# Patient Record
Sex: MALE | Race: WHITE
[De-identification: names, ages, dates, MRNs, and addresses within clinical notes are randomized per-mention and may not be internally consistent; named-entity substitution may affect disease eponyms.]

---

## 2020-01-04 ENCOUNTER — HOSPITAL ENCOUNTER (EMERGENCY)
Dept: HOSPITAL 33 - ED | Age: 25
Discharge: HOME | End: 2020-01-04
Payer: COMMERCIAL

## 2020-01-04 VITALS — OXYGEN SATURATION: 99 % | SYSTOLIC BLOOD PRESSURE: 133 MMHG | DIASTOLIC BLOOD PRESSURE: 98 MMHG | HEART RATE: 88 BPM

## 2020-01-04 DIAGNOSIS — H66.92: Primary | ICD-10-CM

## 2020-01-04 PROCEDURE — 99283 EMERGENCY DEPT VISIT LOW MDM: CPT

## 2020-01-04 NOTE — ERPHSYRPT
- History of Present Illness


Time Seen by Provider: 01/04/20 01:45


Source: patient


Exam Limitations: no limitations


Patient Subjective Stated Complaint: pt states he has an earache that started 

yesterday at home. describes pain as throbbing and pressure


Triage Nursing Assessment: pt alert and oriented, answers questions approp. pt 

ambualtory with steady gait noted. respirations nonlabored with lungs cta. skin 

pink warm and dry. redness noted to lt ear. no drainage noted.


Physician History: 





ppatient is a 24-year-old white male who has had recent upper respiratory 

infection symptoms.  Who awoke with the increasing left ear pain which started 

last evening.


Timing/Duration: gradual onset


Severity: moderate


ENT Location: ear (L)


Prearrival Treatment: no prearrival treatment


Modifying Factors: Improves With: activity


Associated Symptoms: ear pain (L), cough, change in hearing, nasal congestion/

drainage


Hx Tetanus, Diphtheria Vaccination/Date Given: Yes (2019)


Hx Influenza Vaccination/Date Given: No


Hx Pneumococcal Vaccination/Date Given: No





- Review of Systems


Constitutional: No Fever, No Chills


Eyes: No Symptoms


Ears, Nose, & Throat: Ear Pain, Nose Discharge


Respiratory: No Cough, No Dyspnea


Cardiac: No Chest Pain, No Edema, No Syncope


Abdominal/Gastrointestinal: No Abdominal Pain, No Nausea, No Vomiting, No 

Diarrhea


Genitourinary Symptoms: No Dysuria


Musculoskeletal: No Back Pain, No Neck Pain


Skin: No Rash


Neurological: No Dizziness, No Focal Weakness, No Sensory Changes


Psychological: No Symptoms


Endocrine: No Symptoms


All Other Systems: Reviewed and Negative





- Past Medical History


Pertinent Past Medical History: No





- Past Surgical History


Past Surgical History: Yes


Gastrointestinal: Appendectomy





- Social History


Smoking Status: Never smoker


Exposure to second hand smoke: No


Drug Use: none


Patient Lives Alone: Yes





- Nursing Vital Signs


Nursing Vital Signs: 





 Initial Vital Signs











Temperature  98.3 F   01/04/20 01:43


 


Pulse Rate  91 H  01/04/20 01:43


 


Respiratory Rate  16   01/04/20 01:43


 


Blood Pressure  148/102   01/04/20 01:43


 


O2 Sat by Pulse Oximetry  100   01/04/20 01:43








 Pain Scale











Pain Intensity                 7

















- Physical Exam


General Appearance: no apparent distress, alert


Eye Exam: bilateral eye: PERRL, EOMI


Ear Exam: left ear: TM red, TM bulging


Nasal Exam: normal inspection


Throat Exam: pharynx normal, moist mucus membranes, No tonsillar exudate


Neck Exam: supple


Cardiovascular/Respiratory Exam: normal breath sounds, regular rate/rhythm


Abdominal Exam: non-tender, soft


Neurologic Exam: alert, oriented x 3, sensation nml, No motor deficits


Skin Exam: normal color, warm, dry


SpO2: 100





- Course


Nursing assessment & vital signs reviewed: Yes





- Progress


Progress: unchanged





- Departure


Departure Disposition: Home


Clinical Impression: 


 Left otitis media





Condition: Stable


Critical Care Time: No


Referrals: 


[facility] RUSSO [Primary Care Provider] - 


Instructions:  Ear Infections (Otitis Media) (DC)


Prescriptions: 


Hydrocodone/APAP 5-325 Tab^^^ [Norco 5-325 Tablet^^^] 1 tab PO Q4HPRN PRN #5 

tablet MDD 6


 PRN Reason: Pain


Amoxicillin 875 mg PO BID 10 Days #20 tablet